# Patient Record
Sex: FEMALE | Race: WHITE | NOT HISPANIC OR LATINO
[De-identification: names, ages, dates, MRNs, and addresses within clinical notes are randomized per-mention and may not be internally consistent; named-entity substitution may affect disease eponyms.]

---

## 2023-07-03 ENCOUNTER — NON-APPOINTMENT (OUTPATIENT)
Age: 58
End: 2023-07-03

## 2023-07-03 ENCOUNTER — APPOINTMENT (OUTPATIENT)
Dept: CARDIOLOGY | Facility: CLINIC | Age: 58
End: 2023-07-03
Payer: MEDICAID

## 2023-07-03 VITALS
HEART RATE: 65 BPM | HEIGHT: 65 IN | DIASTOLIC BLOOD PRESSURE: 72 MMHG | OXYGEN SATURATION: 97 % | SYSTOLIC BLOOD PRESSURE: 142 MMHG

## 2023-07-03 VITALS — WEIGHT: 240 LBS | BODY MASS INDEX: 39.94 KG/M2

## 2023-07-03 DIAGNOSIS — I10 ESSENTIAL (PRIMARY) HYPERTENSION: ICD-10-CM

## 2023-07-03 DIAGNOSIS — Z82.49 FAMILY HISTORY OF ISCHEMIC HEART DISEASE AND OTHER DISEASES OF THE CIRCULATORY SYSTEM: ICD-10-CM

## 2023-07-03 DIAGNOSIS — E66.9 OBESITY, UNSPECIFIED: ICD-10-CM

## 2023-07-03 DIAGNOSIS — Z01.810 ENCOUNTER FOR PREPROCEDURAL CARDIOVASCULAR EXAMINATION: ICD-10-CM

## 2023-07-03 DIAGNOSIS — Z83.79 FAMILY HISTORY OF OTHER DISEASES OF THE DIGESTIVE SYSTEM: ICD-10-CM

## 2023-07-03 DIAGNOSIS — Z87.898 PERSONAL HISTORY OF OTHER SPECIFIED CONDITIONS: ICD-10-CM

## 2023-07-03 DIAGNOSIS — Z86.79 PERSONAL HISTORY OF OTHER DISEASES OF THE CIRCULATORY SYSTEM: ICD-10-CM

## 2023-07-03 DIAGNOSIS — Z86.39 PERSONAL HISTORY OF OTHER ENDOCRINE, NUTRITIONAL AND METABOLIC DISEASE: ICD-10-CM

## 2023-07-03 DIAGNOSIS — Z78.9 OTHER SPECIFIED HEALTH STATUS: ICD-10-CM

## 2023-07-03 DIAGNOSIS — G43.909 MIGRAINE, UNSPECIFIED, NOT INTRACTABLE, W/OUT STATUS MIGRAINOSUS: ICD-10-CM

## 2023-07-03 PROBLEM — Z00.00 ENCOUNTER FOR PREVENTIVE HEALTH EXAMINATION: Status: ACTIVE | Noted: 2023-07-03

## 2023-07-03 PROCEDURE — 99204 OFFICE O/P NEW MOD 45 MIN: CPT

## 2023-07-03 PROCEDURE — 93000 ELECTROCARDIOGRAM COMPLETE: CPT | Mod: NC

## 2023-07-04 PROBLEM — Z78.9 SOCIAL ALCOHOL USE: Status: ACTIVE | Noted: 2023-07-04

## 2023-07-04 PROBLEM — E66.9 OBESITY: Status: ACTIVE | Noted: 2023-07-04

## 2023-07-04 PROBLEM — Z86.79 HISTORY OF HYPERTENSION: Status: RESOLVED | Noted: 2023-07-04 | Resolved: 2023-07-04

## 2023-07-04 PROBLEM — Z87.898 HISTORY OF PERIPHERAL EDEMA: Status: RESOLVED | Noted: 2023-07-04 | Resolved: 2023-07-04

## 2023-07-04 PROBLEM — Z01.810 PREOPERATIVE CARDIOVASCULAR EXAMINATION: Status: ACTIVE | Noted: 2023-07-04

## 2023-07-04 PROBLEM — G43.909 MIGRAINE: Status: RESOLVED | Noted: 2023-07-04 | Resolved: 2023-07-04

## 2023-07-04 PROBLEM — Z83.79 FAMILY HISTORY OF LIVER DISEASE: Status: ACTIVE | Noted: 2023-07-04

## 2023-07-04 PROBLEM — I10 HYPERTENSION: Status: ACTIVE | Noted: 2023-07-04

## 2023-07-04 PROBLEM — Z82.49 FAMILY HISTORY OF ACUTE MYOCARDIAL INFARCTION: Status: ACTIVE | Noted: 2023-07-04

## 2023-07-04 PROBLEM — Z86.39 HISTORY OF OBESITY: Status: RESOLVED | Noted: 2023-07-04 | Resolved: 2023-07-04

## 2023-07-04 RX ORDER — METOPROLOL SUCCINATE 200 MG/1
TABLET, EXTENDED RELEASE ORAL
Refills: 0 | Status: ACTIVE | COMMUNITY

## 2023-07-04 RX ORDER — TORSEMIDE 5 MG/1
TABLET ORAL
Refills: 0 | Status: ACTIVE | COMMUNITY

## 2023-07-04 RX ORDER — AMLODIPINE BESYLATE 5 MG/1
TABLET ORAL
Refills: 0 | Status: ACTIVE | COMMUNITY

## 2023-07-04 NOTE — PHYSICAL EXAM
[Normal S1, S2] : normal S1, S2 [Clear Lung Fields] : clear lung fields [Soft] : abdomen soft [Non Tender] : non-tender [Normal Bowel Sounds] : normal bowel sounds [No Rash] : no rash [Normal Gait] : normal gait [Normal] : moves all extremities, no focal deficits, normal speech [de-identified] : Normocephalic [de-identified] : Appears overweight in no distress lying flat [de-identified] : No neck vein distention.  No carotid bruit.  Thyroid not palpable [de-identified] : No murmur.  No gallop.  No diastolic sounds [de-identified] : Full range of motion [de-identified] : No peripheral edema.  Dorsalis pedis pulses +2 bilaterally.  Feet warm and well-perfused.  No ulcerations. [de-identified] : Pleasant

## 2023-07-04 NOTE — HISTORY OF PRESENT ILLNESS
[FreeTextEntry1] : 58-year-old female\par Preoperative cardiovascular semination\par \par Persistent back pain due to lumbar disc disease led to a recommendation for microdiscectomy by Dr. Baldwin/orthopedic surgery.  The details of the evaluation imaging studies etc. are not available.  The operation is planned for 7/28/2023\par \par Mrs. Espinosa has no known heart disease.  There is no history of a myocardial infarction angina or congestive heart failure.  There is a previous history of a "arrhythmia" hypertension and peripheral edema.  The details/medical records of the cardiovascular evaluation are not available at this time for review.\par \par Mrs. Espinosa has no history of smoking diabetes hyperlipidemia or alcohol abuse.  No illicit drug use.\par Latoya denies exertional chest pain, shortness of breath at rest, palpitations or syncope.\par \par Mrs. Espinosa is accompanied today by her daughter who provides English translation. \par \par Mrs. Pierre presents today for cardiology clearance prior to spine surgery

## 2023-07-04 NOTE — DISCUSSION/SUMMARY
[FreeTextEntry1] : Preoperative cardiovascular examination\par \par Persistent back pain due to lumbar disc disease led to a recommendation for microdiscectomy by Dr. Baldwin/orthopedic surgery.  The details of the evaluation imaging studies etc. are not available.  The operation is planned for 7/28/2023.\par \par \par Comment\par There is no cardiac contraindication to spine surgery.  There is no history of a myocardial infarction.  The patient is hemodynamically stable without evidence of heart failure .The preoperative 7/03/23 electrocardiogram shows no evidence of myocardial ischemia or infarction.  As such, the operation may proceed with acceptable cardiac risk.  Review of prior cardiac noninvasive studies not available at this time would be helpful for management decisions.\par \par I have recommended the following\par a.  Await preoperative laboratory studies and chest x-ray not presently available\par b.  Await noninvasive cardiac studies previously performed\par c.  Continue beta-blockers (metoprolol succinate 50 mg/day) without interruption in the perioperative period\par d.  Hold torsemide 1 day prior to surgery\par e.  Further cardiac testing is not required at this time\par f.  Work-up and treatment as directed by Dr. Baldwin\par g.  Call if any perioperative cardiovascular issues arise\par \par \par \par \par \par Hypertension\par Hypertension is reportedly controlled on the present medical regimen.  ACE–I/ARB therapy may be added to the present medical regimen if required to maintain optimal levels.\par \par I have recommended the following\par a.  Low-salt heart healthy diet.  Regular aerobic exercise and weight loss\par b.  Continue the present medical regimen\par c.  Home blood pressure monitoring\par d.  Addition of ACE–I/ARB therapy if required to maintain optimal levels as discussed above\par \par \par \par Obesity\par Obesity exacerbates Mrs. Espinosa's  cardiovascular issues.  Today  Naveed  is 5 feet 5 inches tall and weighs 240 pounds.  Diet exercise and weight loss are advised.\par \par \par The diagnosis, prognosis, risks, options and alternatives were explained at length to the patient and family.  All questions were answered.  Issues discussed included cardiology clearance prior to surgery atherosclerotic heart disease cardiac arrhythmias hypertension obesity noninvasive cardiac testing diet exercise and weight loss.